# Patient Record
Sex: MALE | Race: OTHER | Employment: UNEMPLOYED | URBAN - METROPOLITAN AREA
[De-identification: names, ages, dates, MRNs, and addresses within clinical notes are randomized per-mention and may not be internally consistent; named-entity substitution may affect disease eponyms.]

---

## 2019-07-13 ENCOUNTER — OFFICE VISIT (OUTPATIENT)
Dept: INTERNAL MEDICINE CLINIC | Facility: CLINIC | Age: 34
End: 2019-07-13

## 2019-07-13 VITALS
HEIGHT: 64 IN | WEIGHT: 177 LBS | HEART RATE: 70 BPM | SYSTOLIC BLOOD PRESSURE: 117 MMHG | BODY MASS INDEX: 30.22 KG/M2 | DIASTOLIC BLOOD PRESSURE: 74 MMHG | RESPIRATION RATE: 12 BRPM | TEMPERATURE: 98 F

## 2019-07-13 DIAGNOSIS — R10.11 RUQ ABDOMINAL PAIN: ICD-10-CM

## 2019-07-13 DIAGNOSIS — Z00.00 ANNUAL PHYSICAL EXAM: Primary | ICD-10-CM

## 2019-07-13 DIAGNOSIS — R22.1 NECK MASS: ICD-10-CM

## 2019-07-13 PROCEDURE — 99203 OFFICE O/P NEW LOW 30 MIN: CPT | Performed by: INTERNAL MEDICINE

## 2019-07-13 PROCEDURE — 99385 PREV VISIT NEW AGE 18-39: CPT | Performed by: INTERNAL MEDICINE

## 2019-07-13 NOTE — PROGRESS NOTES
HPI:    Patient ID: Ronnie Rao is a 36year old male. Patient presents today for annual physical.    Mass   This is a chronic (complained of growing mass on the anterior neck for at least  2 yrs) problem.  The current episode started more than 1 ye current outpatient medications on file. Allergies:  Seasonal                OTHER (SEE COMMENTS)    Comment:Sneezing, watery eyes   PHYSICAL EXAM:   Physical Exam   Constitutional: He is oriented to person, place, and time.  He appears well-developed and w place, and time. Skin: Skin is warm and dry. No rash noted. He is not diaphoretic.               ASSESSMENT/PLAN:   (Z00.00) Annual physical exam  (primary encounter diagnosis)  Plan: CBC WITH DIFFERENTIAL WITH PLATELET, COMP         METABOLIC PANEL (14),

## 2019-07-22 ENCOUNTER — OFFICE VISIT (OUTPATIENT)
Dept: OTOLARYNGOLOGY | Facility: CLINIC | Age: 34
End: 2019-07-22

## 2019-07-22 VITALS
SYSTOLIC BLOOD PRESSURE: 126 MMHG | HEART RATE: 67 BPM | BODY MASS INDEX: 30.22 KG/M2 | DIASTOLIC BLOOD PRESSURE: 85 MMHG | TEMPERATURE: 98 F | HEIGHT: 64 IN | WEIGHT: 177 LBS

## 2019-07-22 DIAGNOSIS — L72.3 INFECTED SEBACEOUS CYST OF SKIN: Primary | ICD-10-CM

## 2019-07-22 DIAGNOSIS — L08.9 INFECTED SEBACEOUS CYST OF SKIN: Primary | ICD-10-CM

## 2019-07-22 PROCEDURE — 99243 OFF/OP CNSLTJ NEW/EST LOW 30: CPT | Performed by: OTOLARYNGOLOGY

## 2019-07-22 RX ORDER — CEPHALEXIN 500 MG/1
500 CAPSULE ORAL 3 TIMES DAILY
Qty: 30 CAPSULE | Refills: 0 | Status: SHIPPED | OUTPATIENT
Start: 2019-07-22 | End: 2019-08-01

## 2019-07-22 RX ORDER — DEXAMETHASONE 6 MG/1
6 TABLET ORAL
Qty: 3 TABLET | Refills: 0 | Status: SHIPPED | OUTPATIENT
Start: 2019-07-22 | End: 2019-07-25

## 2019-07-22 NOTE — PROGRESS NOTES
Salas Good is a 36year old male.   Patient presents with:  Mass: center neck ,had had for sometime,has recent growth overnight ,complains of a stinging sensation and pain       HISTORY OF PRESENT ILLNESS  7/22/2019  Patient presents for evaluation of Negative Cold intolerance and heat intolerance. Neuro Negative Tremors. Psych Negative Anxiety and depression. Integumentary Negative Frequent skin infections, pigment change and rash. Hema/Lymph Negative Easy bleeding and easy bruising. sebaceous cyst that may might require removal if this does not resolve recommend short course of antibiotics and steroids and follow-up in a week. The risks of steroids were discussed at length with the patient.  These include: Cataracts osteoporosis, thinni

## 2022-01-19 ENCOUNTER — LAB ENCOUNTER (OUTPATIENT)
Dept: LAB | Age: 37
End: 2022-01-19
Attending: FAMILY MEDICINE
Payer: COMMERCIAL

## 2022-01-19 DIAGNOSIS — B35.1 ONYCHOMYCOSIS: Primary | ICD-10-CM

## 2022-01-19 LAB
ALBUMIN SERPL-MCNC: 3.9 G/DL (ref 3.4–5)
ALP LIVER SERPL-CCNC: 79 U/L
ALT SERPL-CCNC: 42 U/L
AST SERPL-CCNC: 18 U/L (ref 15–37)
BILIRUB DIRECT SERPL-MCNC: <0.1 MG/DL (ref 0–0.2)
BILIRUB SERPL-MCNC: 0.3 MG/DL (ref 0.1–2)
PROT SERPL-MCNC: 7.5 G/DL (ref 6.4–8.2)

## 2022-01-19 PROCEDURE — 36415 COLL VENOUS BLD VENIPUNCTURE: CPT

## 2022-01-19 PROCEDURE — 80076 HEPATIC FUNCTION PANEL: CPT

## 2022-03-21 ENCOUNTER — OFFICE VISIT (OUTPATIENT)
Dept: FAMILY MEDICINE CLINIC | Facility: CLINIC | Age: 37
End: 2022-03-21
Payer: COMMERCIAL

## 2022-03-21 ENCOUNTER — HOSPITAL ENCOUNTER (OUTPATIENT)
Dept: GENERAL RADIOLOGY | Age: 37
Discharge: HOME | End: 2022-03-21
Attending: FAMILY MEDICINE
Payer: COMMERCIAL

## 2022-03-21 VITALS
BODY MASS INDEX: 29.94 KG/M2 | HEIGHT: 64 IN | TEMPERATURE: 98 F | SYSTOLIC BLOOD PRESSURE: 111 MMHG | WEIGHT: 175.38 LBS | HEART RATE: 59 BPM | DIASTOLIC BLOOD PRESSURE: 75 MMHG

## 2022-03-21 DIAGNOSIS — S20.211A CONTUSION, CHEST WALL, RIGHT, INITIAL ENCOUNTER: ICD-10-CM
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
## 2022-03-21 DIAGNOSIS — R20.0 NUMBNESS: ICD-10-CM

## 2022-03-21 DIAGNOSIS — Z00.00 ADULT GENERAL MEDICAL EXAM: Primary | ICD-10-CM

## 2022-03-21 DIAGNOSIS — Z11.3 SCREEN FOR STD (SEXUALLY TRANSMITTED DISEASE): ICD-10-CM

## 2022-03-21 DIAGNOSIS — Z23 NEED FOR VACCINATION: ICD-10-CM
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
## 2022-03-21 PROCEDURE — 71101 X-RAY EXAM UNILAT RIBS/CHEST: CPT | Performed by: FAMILY MEDICINE

## 2022-03-21 PROCEDURE — 99213 OFFICE O/P EST LOW 20 MIN: CPT | Performed by: FAMILY MEDICINE

## 2022-03-21 PROCEDURE — 3078F DIAST BP <80 MM HG: CPT | Performed by: FAMILY MEDICINE

## 2022-03-21 PROCEDURE — 3074F SYST BP LT 130 MM HG: CPT | Performed by: FAMILY MEDICINE

## 2022-03-21 PROCEDURE — 3008F BODY MASS INDEX DOCD: CPT | Performed by: FAMILY MEDICINE

## 2022-03-21 PROCEDURE — 99385 PREV VISIT NEW AGE 18-39: CPT | Performed by: FAMILY MEDICINE

## 2022-03-21 PROCEDURE — 90715 TDAP VACCINE 7 YRS/> IM: CPT | Performed by: FAMILY MEDICINE

## 2022-03-21 PROCEDURE — 90471 IMMUNIZATION ADMIN: CPT | Performed by: FAMILY MEDICINE

## 2022-03-21 RX ORDER — TERBINAFINE HYDROCHLORIDE 250 MG/1
250 TABLET ORAL DAILY
COMMUNITY
Start: 2022-03-04

## 2022-04-02 ENCOUNTER — LAB ENCOUNTER (OUTPATIENT)
Dept: LAB | Age: 37
End: 2022-04-02
Attending: FAMILY MEDICINE
Payer: COMMERCIAL

## 2022-04-02 DIAGNOSIS — Z00.00 ADULT GENERAL MEDICAL EXAM: ICD-10-CM

## 2022-04-02 DIAGNOSIS — Z11.3 SCREEN FOR STD (SEXUALLY TRANSMITTED DISEASE): ICD-10-CM

## 2022-04-02 LAB
ANION GAP SERPL CALC-SCNC: 5 MMOL/L (ref 0–18)
BASOPHILS # BLD AUTO: 0.02 X10(3) UL (ref 0–0.2)
BASOPHILS NFR BLD AUTO: 0.2 %
BUN BLD-MCNC: 11 MG/DL (ref 7–18)
BUN/CREAT SERPL: 9.2 (ref 10–20)
CALCIUM BLD-MCNC: 9.3 MG/DL (ref 8.5–10.1)
CHLORIDE SERPL-SCNC: 106 MMOL/L (ref 98–112)
CHOLEST SERPL-MCNC: 239 MG/DL (ref ?–200)
CO2 SERPL-SCNC: 28 MMOL/L (ref 21–32)
CREAT BLD-MCNC: 1.19 MG/DL
DEPRECATED RDW RBC AUTO: 42.1 FL (ref 35.1–46.3)
EOSINOPHIL # BLD AUTO: 0.09 X10(3) UL (ref 0–0.7)
EOSINOPHIL NFR BLD AUTO: 1.1 %
ERYTHROCYTE [DISTWIDTH] IN BLOOD BY AUTOMATED COUNT: 12.9 % (ref 11–15)
FASTING PATIENT LIPID ANSWER: YES
FASTING STATUS PATIENT QL REPORTED: YES
GLUCOSE BLD-MCNC: 91 MG/DL (ref 70–99)
HCT VFR BLD AUTO: 51.2 %
HDLC SERPL-MCNC: 45 MG/DL (ref 40–59)
HGB BLD-MCNC: 17.3 G/DL
IMM GRANULOCYTES # BLD AUTO: 0.04 X10(3) UL (ref 0–1)
IMM GRANULOCYTES NFR BLD: 0.5 %
LDLC SERPL CALC-MCNC: 165 MG/DL (ref ?–100)
LYMPHOCYTES # BLD AUTO: 1.74 X10(3) UL (ref 1–4)
LYMPHOCYTES NFR BLD AUTO: 20.7 %
MCH RBC QN AUTO: 29.9 PG (ref 26–34)
MCHC RBC AUTO-ENTMCNC: 33.8 G/DL (ref 31–37)
MCV RBC AUTO: 88.6 FL
MONOCYTES # BLD AUTO: 0.65 X10(3) UL (ref 0.1–1)
MONOCYTES NFR BLD AUTO: 7.7 %
NEUTROPHILS # BLD AUTO: 5.85 X10 (3) UL (ref 1.5–7.7)
NEUTROPHILS # BLD AUTO: 5.85 X10(3) UL (ref 1.5–7.7)
NEUTROPHILS NFR BLD AUTO: 69.8 %
NONHDLC SERPL-MCNC: 194 MG/DL (ref ?–130)
OSMOLALITY SERPL CALC.SUM OF ELEC: 287 MOSM/KG (ref 275–295)
PLATELET # BLD AUTO: 291 10(3)UL (ref 150–450)
POTASSIUM SERPL-SCNC: 4.6 MMOL/L (ref 3.5–5.1)
RBC # BLD AUTO: 5.78 X10(6)UL
SODIUM SERPL-SCNC: 139 MMOL/L (ref 136–145)
TRIGL SERPL-MCNC: 156 MG/DL (ref 30–149)
TSI SER-ACNC: 3.16 MIU/ML (ref 0.36–3.74)
VLDLC SERPL CALC-MCNC: 31 MG/DL (ref 0–30)
WBC # BLD AUTO: 8.4 X10(3) UL (ref 4–11)

## 2022-04-02 PROCEDURE — 80048 BASIC METABOLIC PNL TOTAL CA: CPT

## 2022-04-02 PROCEDURE — 36415 COLL VENOUS BLD VENIPUNCTURE: CPT

## 2022-04-02 PROCEDURE — 87591 N.GONORRHOEAE DNA AMP PROB: CPT

## 2022-04-02 PROCEDURE — 87491 CHLMYD TRACH DNA AMP PROBE: CPT

## 2022-04-02 PROCEDURE — 85025 COMPLETE CBC W/AUTO DIFF WBC: CPT

## 2022-04-02 PROCEDURE — 80061 LIPID PANEL: CPT

## 2022-04-02 PROCEDURE — 84443 ASSAY THYROID STIM HORMONE: CPT

## 2022-04-02 PROCEDURE — 87389 HIV-1 AG W/HIV-1&-2 AB AG IA: CPT

## 2022-04-04 LAB
C TRACH DNA SPEC QL NAA+PROBE: NEGATIVE
N GONORRHOEA DNA SPEC QL NAA+PROBE: NEGATIVE

## 2024-11-08 ENCOUNTER — OFFICE VISIT (OUTPATIENT)
Dept: FAMILY MEDICINE CLINIC | Facility: CLINIC | Age: 39
End: 2024-11-08

## 2024-11-08 VITALS
WEIGHT: 175 LBS | DIASTOLIC BLOOD PRESSURE: 75 MMHG | BODY MASS INDEX: 29.88 KG/M2 | TEMPERATURE: 97 F | HEART RATE: 83 BPM | SYSTOLIC BLOOD PRESSURE: 112 MMHG | HEIGHT: 64 IN

## 2024-11-08 DIAGNOSIS — L72.3 SEBACEOUS CYST: ICD-10-CM

## 2024-11-08 DIAGNOSIS — Z00.00 ADULT GENERAL MEDICAL EXAM: Primary | ICD-10-CM

## 2024-11-08 DIAGNOSIS — R10.11 RUQ ABDOMINAL PAIN: ICD-10-CM

## 2024-11-08 NOTE — PROGRESS NOTES
Patient ID: Amor Jolley is a 39 year old male.    Throat Problem  Pertinent negatives include no chest pain.     Chief Complaint   Patient presents with    Throat Problem     Mass / bump in throat    Routine Physical     Last physical done on 03/21/2022.    Pt works in sales. He is  and doesn't smoke.     I reviewed labs from 2022.   He would like a physical exam today but also discussed the 2 issues that I had seen him for 2 years ago.    Pt has a bump on his upper chest for many years. He went to a specialist and was given medication as they felt it was an infected sebaceous cyst.. It went away for a while and has since returned. He states it is now growing in size. I discussed with him.  He states it does not drain.    He also wants to consult on mass that sometimes cramps up on his abdomen on the right upper quadrant. These episodes occur when he bends over to pick something up or if he sits for a while. Pt says it feels like a cramp or like something is going to burst inside.  He states when this happens he has to stand up straight very quickly to resolve it.  He does not know if it is a hernia.  This has been going on for many years. I reviewed rib/chest XR. I discussed with him.    Health Maintenance   Topic Date Due    Annual Physical  03/21/2023    Annual Depression Screening  01/01/2024    COVID-19 Vaccine (4 - 2023-24 season) 09/01/2024    Influenza Vaccine (1) 06/30/2025 (Originally 10/1/2024)    DTaP,Tdap,and Td Vaccines (3 - Td or Tdap) 03/21/2032    Pneumococcal Vaccine: Birth to 64yrs  Aged Out       =======================================================    Lab Results   Component Value Date    WBC 8.4 04/02/2022    RBC 5.78 (H) 04/02/2022    HGB 17.3 04/02/2022    HCT 51.2 04/02/2022    .0 04/02/2022    MCV 88.6 04/02/2022    MCH 29.9 04/02/2022    MCHC 33.8 04/02/2022    RDW 12.9 04/02/2022    NEPRELIM 5.85 04/02/2022    NEPERCENT 69.8 04/02/2022    LYPERCENT 20.7  04/02/2022    MOPERCENT 7.7 04/02/2022    EOPERCENT 1.1 04/02/2022    BAPERCENT 0.2 04/02/2022    NE 5.85 04/02/2022    LYMABS 1.74 04/02/2022    MOABSO 0.65 04/02/2022    EOABSO 0.09 04/02/2022    BAABSO 0.02 04/02/2022       Lab Results   Component Value Date    GLU 91 04/02/2022    BUN 11 04/02/2022    BUNCREA 9.2 (L) 04/02/2022    CREATSERUM 1.19 04/02/2022    ANIONGAP 5 04/02/2022    GFRNAA 79 04/02/2022    GFRAA 91 04/02/2022    CA 9.3 04/02/2022    OSMOCALC 287 04/02/2022    ALKPHO 79 01/19/2022    AST 18 01/19/2022    ALT 42 01/19/2022    BILT 0.3 01/19/2022    TP 7.5 01/19/2022    ALB 3.9 01/19/2022     04/02/2022    K 4.6 04/02/2022     04/02/2022    CO2 28.0 04/02/2022       Lab Results   Component Value Date    GLU 91 04/02/2022    BUN 11 04/02/2022    CREATSERUM 1.19 04/02/2022    BUNCREA 9.2 (L) 04/02/2022    ANIONGAP 5 04/02/2022    GFRAA 91 04/02/2022    GFRNAA 79 04/02/2022    CA 9.3 04/02/2022     04/02/2022    K 4.6 04/02/2022     04/02/2022    CO2 28.0 04/02/2022    OSMOCALC 287 04/02/2022         Lab Results   Component Value Date    CHOLEST 239 (H) 04/02/2022    TRIG 156 (H) 04/02/2022    HDL 45 04/02/2022     (H) 04/02/2022    VLDL 31 (H) 04/02/2022    NONHDLC 194 (H) 04/02/2022     TSH (mIU/mL)   Date Value   04/02/2022 3.160         =======================================================    Wt Readings from Last 6 Encounters:   11/08/24 175 lb   03/21/22 175 lb 6.4 oz   07/22/19 177 lb   07/13/19 177 lb               BMI Readings from Last 6 Encounters:   11/08/24 30.04 kg/m²   03/21/22 30.11 kg/m²   07/22/19 30.38 kg/m²   07/13/19 30.38 kg/m²       BP Readings from Last 6 Encounters:   11/08/24 112/75   03/21/22 111/75   07/22/19 126/85   07/13/19 117/74         Review of Systems   Respiratory:  Negative for shortness of breath.    Cardiovascular:  Negative for chest pain.         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       Social History     Socioeconomic History    Marital status:      Spouse name: Not on file    Number of children: Not on file    Years of education: Not on file    Highest education level: Not on file   Occupational History    Not on file   Tobacco Use    Smoking status: Never    Smokeless tobacco: Never   Vaping Use    Vaping status: Some Days   Substance and Sexual Activity    Alcohol use: Yes     Comment: social    Drug use: Never    Sexual activity: Not on file   Other Topics Concern    Not on file   Social History Narrative    Not on file     Social Drivers of Health     Financial Resource Strain: Not on file   Food Insecurity: Not on file   Transportation Needs: Not on file   Physical Activity: Not on file   Stress: Not on file   Social Connections: Not on file   Housing Stability: Not on file          No current outpatient medications on file.     Allergies:Allergies[1]   PHYSICAL EXAM:   Physical Exam      Physical Exam   Constitutional: He appears well-developed and well-nourished. No distress.   Head: Normocephalic.   Right Ear: Tympanic membrane and ear canal normal.   Left Ear: Tympanic membrane and ear canal normal.   Nose: No mucosal edema or rhinorrhea.  Mouth/Throat: Oropharynx is clear and moist and mucous membranes are normal.   Eyes: Conjunctivae and EOM are normal. Pupils are equal, round, and reactive to light.   Neck: Normal range of motion. Neck supple. No thyromegaly present.   Cardiovascular: Normal rate, regular rhythm and no murmur heard.   Pulmonary/Chest: Effort normal and breath sounds normal. No respiratory distress.   Abdominal: Soft. Bowel sounds are normal. There is no hepatosplenomegaly. There is no tenderness.  Possible area of firmness the size of a golf ball in the right upper quadrant when he is standing up but still very hard to palpate.  There is  no swelling in this area.  When I have him lie down I cannot feel it and he cannot find it.  Lymphadenopathy: He has no cervical adenopathy.   Neurological: He is alert and oriented to person, place, and time. He has normal reflexes. No cranial nerve deficit.   Skin: Skin is warm and dry. No rash noted. 14 mm mobile sebaceous cyst on the right upper chest wall above the sternal notch  Psychiatric: He has a normal mood and affect.  Lower legs: No edema of the legs bilaterally.     Vitals reviewed.    Blood pressure 112/75, pulse 83, temperature 97.4 °F (36.3 °C), temperature source Temporal, height 5' 4\" (1.626 m), weight 175 lb.         ASSESSMENT/PLAN:     Diagnoses and all orders for this visit:    Adult general medical exam  -     CBC With Differential With Platelet; Future  -     Comp Metabolic Panel (14); Future  -     Lipid Panel; Future  -     Assay, Thyroid Stim Hormone; Future  He will return to clinic for labs  Sebaceous cyst  -     SURGERY - INTERNAL  He is going to see a general surgeon to possibly get the sebaceous cyst that is noticeable in the sternal notch area.  RUQ abdominal pain  -     SURGERY - INTERNAL  Have him see the general surgeon also rule out hernia.      Referrals (if applicable)  Orders Placed This Encounter   Procedures    SURGERY - INTERNAL     If he is busy, roman can see 1 of his partners.     Referral Priority:   Routine     Referral Type:   OFFICE VISIT     Referred to Provider:   Alvin Cho MD     Requested Specialty:   SURGERY, GENERAL     Number of Visits Requested:   3       Follow up if symptoms persist.  Take medicine (if given) as prescribed.  Approach to treatment discussed and patient/family member understands and agrees to plan.     No follow-ups on file.    There are no Patient Instructions on file for this visit.    Abby Yo    11/8/2024    By signing my name below, I, Abby Yo,  attest that this documentation has been prepared under the direction  and in the presence of Robert Hogan DO.   Electronically Signed: Abby Srinath, 11/8/2024, 3:04 PM.    I, Robert Hogan DO,  personally performed the services described in this documentation. All medical record entries made by the scribe were at my direction and in my presence.  I have reviewed the chart and discharge instructions (if applicable) and agree that the record reflects my personal performance and is accurate and complete.  Robert Hogan DO, 11/8/2024, 3:19 PM                  [1]   Allergies  Allergen Reactions    Seasonal OTHER (SEE COMMENTS)     Sneezing, watery eyes

## 2024-11-11 ENCOUNTER — LAB ENCOUNTER (OUTPATIENT)
Dept: LAB | Age: 39
End: 2024-11-11
Attending: FAMILY MEDICINE
Payer: COMMERCIAL

## 2024-11-11 DIAGNOSIS — Z00.00 ADULT GENERAL MEDICAL EXAM: ICD-10-CM

## 2024-11-11 LAB
ALBUMIN SERPL-MCNC: 4.2 G/DL (ref 3.2–4.8)
ALBUMIN/GLOB SERPL: 1.6 {RATIO} (ref 1–2)
ALP LIVER SERPL-CCNC: 64 U/L
ALT SERPL-CCNC: 27 U/L
ANION GAP SERPL CALC-SCNC: 7 MMOL/L (ref 0–18)
AST SERPL-CCNC: 23 U/L (ref ?–34)
BASOPHILS # BLD AUTO: 0.02 X10(3) UL (ref 0–0.2)
BASOPHILS NFR BLD AUTO: 0.4 %
BILIRUB SERPL-MCNC: 0.7 MG/DL (ref 0.3–1.2)
BUN BLD-MCNC: 11 MG/DL (ref 9–23)
BUN/CREAT SERPL: 10.8 (ref 10–20)
CALCIUM BLD-MCNC: 9.3 MG/DL (ref 8.7–10.4)
CHLORIDE SERPL-SCNC: 109 MMOL/L (ref 98–112)
CHOLEST SERPL-MCNC: 237 MG/DL (ref ?–200)
CO2 SERPL-SCNC: 25 MMOL/L (ref 21–32)
CREAT BLD-MCNC: 1.02 MG/DL
DEPRECATED RDW RBC AUTO: 39.4 FL (ref 35.1–46.3)
EGFRCR SERPLBLD CKD-EPI 2021: 96 ML/MIN/1.73M2 (ref 60–?)
EOSINOPHIL # BLD AUTO: 0.05 X10(3) UL (ref 0–0.7)
EOSINOPHIL NFR BLD AUTO: 0.9 %
ERYTHROCYTE [DISTWIDTH] IN BLOOD BY AUTOMATED COUNT: 12.6 % (ref 11–15)
FASTING PATIENT LIPID ANSWER: YES
FASTING STATUS PATIENT QL REPORTED: YES
GLOBULIN PLAS-MCNC: 2.6 G/DL (ref 2–3.5)
GLUCOSE BLD-MCNC: 93 MG/DL (ref 70–99)
HCT VFR BLD AUTO: 47.1 %
HDLC SERPL-MCNC: 40 MG/DL (ref 40–59)
HGB BLD-MCNC: 16.5 G/DL
IMM GRANULOCYTES # BLD AUTO: 0.02 X10(3) UL (ref 0–1)
IMM GRANULOCYTES NFR BLD: 0.4 %
LDLC SERPL CALC-MCNC: 172 MG/DL (ref ?–100)
LYMPHOCYTES # BLD AUTO: 1.85 X10(3) UL (ref 1–4)
LYMPHOCYTES NFR BLD AUTO: 33.8 %
MCH RBC QN AUTO: 30.3 PG (ref 26–34)
MCHC RBC AUTO-ENTMCNC: 35 G/DL (ref 31–37)
MCV RBC AUTO: 86.4 FL
MONOCYTES # BLD AUTO: 0.49 X10(3) UL (ref 0.1–1)
MONOCYTES NFR BLD AUTO: 9 %
NEUTROPHILS # BLD AUTO: 3.04 X10 (3) UL (ref 1.5–7.7)
NEUTROPHILS # BLD AUTO: 3.04 X10(3) UL (ref 1.5–7.7)
NEUTROPHILS NFR BLD AUTO: 55.5 %
NONHDLC SERPL-MCNC: 197 MG/DL (ref ?–130)
OSMOLALITY SERPL CALC.SUM OF ELEC: 291 MOSM/KG (ref 275–295)
PLATELET # BLD AUTO: 277 10(3)UL (ref 150–450)
POTASSIUM SERPL-SCNC: 4.2 MMOL/L (ref 3.5–5.1)
PROT SERPL-MCNC: 6.8 G/DL (ref 5.7–8.2)
RBC # BLD AUTO: 5.45 X10(6)UL
SODIUM SERPL-SCNC: 141 MMOL/L (ref 136–145)
TRIGL SERPL-MCNC: 136 MG/DL (ref 30–149)
TSI SER-ACNC: 2.22 UIU/ML (ref 0.55–4.78)
VLDLC SERPL CALC-MCNC: 27 MG/DL (ref 0–30)
WBC # BLD AUTO: 5.5 X10(3) UL (ref 4–11)

## 2024-11-11 PROCEDURE — 85025 COMPLETE CBC W/AUTO DIFF WBC: CPT

## 2024-11-11 PROCEDURE — 80061 LIPID PANEL: CPT

## 2024-11-11 PROCEDURE — 84443 ASSAY THYROID STIM HORMONE: CPT

## 2024-11-11 PROCEDURE — 36415 COLL VENOUS BLD VENIPUNCTURE: CPT

## 2024-11-11 PROCEDURE — 80053 COMPREHEN METABOLIC PANEL: CPT

## (undated) NOTE — LETTER
Maura Both, [address] [address]  [address] [facility], [address] [facility]       07/22/19        Patient: Kieran Galeano   YOB: 1985   Date of Visit: 7/22/2019       Dear  Dr. Karolina Hopkins MD,      Thank you for referring Kieran Galeano

## (undated) NOTE — LETTER
10/14/19        Lo Jaeger  [address] [facility]      Dear Cooper Every,    [address] [facility] records indicate that you have outstanding lab work and or testing that was ordered for you and has not yet been completed:  Orders Placed This Encounter      CBC

## (undated) NOTE — LETTER
07/27/20        [facility]  [address] [facility]      Dear Leo Morataya,    [address] [facility] records indicate that you have outstanding lab work and or testing that was ordered for you and has not yet been completed:  Orders Placed This Encounter      CBC

## (undated) NOTE — LETTER
04/17/20        July Celis  [address] [facility]      Dear Randell Dsouza,    [address] [facility] records indicate that you have outstanding lab work and or testing that was ordered for you and has not yet been completed:  Orders Placed This Encounter      CBC